# Patient Record
Sex: FEMALE | Race: WHITE | NOT HISPANIC OR LATINO | URBAN - METROPOLITAN AREA
[De-identification: names, ages, dates, MRNs, and addresses within clinical notes are randomized per-mention and may not be internally consistent; named-entity substitution may affect disease eponyms.]

---

## 2019-02-13 ENCOUNTER — APPOINTMENT (RX ONLY)
Dept: URBAN - METROPOLITAN AREA CLINIC 129 | Facility: CLINIC | Age: 59
Setting detail: DERMATOLOGY
End: 2019-02-13

## 2019-02-13 DIAGNOSIS — Z41.9 ENCOUNTER FOR PROCEDURE FOR PURPOSES OTHER THAN REMEDYING HEALTH STATE, UNSPECIFIED: ICD-10-CM

## 2019-02-13 PROCEDURE — ? BOTOX

## 2019-02-13 NOTE — PROCEDURE: BOTOX
Consent: Written consent obtained. Risks include but not limited to lid/brow ptosis, bruising, swelling, diplopia, temporary effect, incomplete chemical denervation.
Additional Area 2 Units: 0
Detail Level: Detailed
Periorbital Skin Units: [address] [address]
Post-Care Instructions: Patient instructed to not lie down for 4 hours and limit physical activity for 24 hours. Patient instructed not to travel by airplane for 48 hours.
Forehead Units: 8
Glabellar Complex Units: 15
Dilution (U/0.1 Cc): 1

## 2020-02-11 ENCOUNTER — APPOINTMENT (RX ONLY)
Dept: URBAN - METROPOLITAN AREA CLINIC 127 | Facility: CLINIC | Age: 60
Setting detail: DERMATOLOGY
End: 2020-02-11

## 2020-02-11 DIAGNOSIS — Z41.9 ENCOUNTER FOR PROCEDURE FOR PURPOSES OTHER THAN REMEDYING HEALTH STATE, UNSPECIFIED: ICD-10-CM

## 2020-02-11 PROCEDURE — ? BOTOX

## 2020-02-11 NOTE — PROCEDURE: BOTOX
Additional Area 3 Units: 0
Consent: Written consent obtained. Risks include but not limited to lid/brow ptosis, bruising, swelling, diplopia, temporary effect, incomplete chemical denervation.
Show Additional Area 5: Yes
Show Ucl Units: No
Forehead Units: 8
Detail Level: Detailed
Post-Care Instructions: Patient instructed to not lie down for 4 hours and limit physical activity for 24 hours. Patient instructed not to travel by airplane for 48 hours.
Dilution (U/0.1 Cc): 1
Glabellar Complex Units: 15
Periorbital Skin Units: [address] [address]

## 2021-02-18 ENCOUNTER — APPOINTMENT (RX ONLY)
Dept: URBAN - METROPOLITAN AREA CLINIC 126 | Facility: CLINIC | Age: 61
Setting detail: DERMATOLOGY
End: 2021-02-18

## 2021-02-18 DIAGNOSIS — Z41.9 ENCOUNTER FOR PROCEDURE FOR PURPOSES OTHER THAN REMEDYING HEALTH STATE, UNSPECIFIED: ICD-10-CM

## 2021-02-18 PROCEDURE — ? FILLERS

## 2021-02-18 PROCEDURE — ? BOTOX

## 2021-02-18 NOTE — PROCEDURE: BOTOX
Anterior Platysmal Bands Units: 0
Show Glabellar Units: Yes
Expiration Date (Month Year): 3/2023
Additional Area 3 Location: bunnies
Show Mentalis Units: No
Periorbital Skin Units: [address] [address]
Lot #: [id number]
Additional Area 4 Location: upper lip lines
Detail Level: Simple
Glabellar Complex Units: 15
Post-Care Instructions: Patient instructed to not lie down for 4 hours and limit physical activity for 24 hours. Patient instructed not to travel by airplane for 48 hours.
Additional Area 1 Location: brow lift
Additional Area 5 Location: left lateral forehead
Dilution (U/0.1 Cc): 1
Forehead Units: 8
Additional Area 2 Location: parentheses
Consent: Written consent obtained. Risks include but not limited to lid/brow ptosis, bruising, swelling, diplopia, temporary effect, incomplete chemical denervation.
Additional Area 6 Location: axillae

## 2021-02-18 NOTE — PROCEDURE: FILLERS
[place]ion[place] Lines Filler  Volume In Cc: 0
Additional Area 3 Location: corners
Detail Level: Zone
Additional Area 2 Location: lip borders
Include Cannula Information In Note?: No
Additional Area 5 Location: earlobes
Consent: Written consent obtained. Risks include but not limited to bruising, beading, irregular texture, ulceration, infection, allergic reaction, scar formation, incomplete augmentation, temporary nature, procedural pain.
Marionette Lines Filler  Volume In Cc: 1
Additional Area 4 Location: smile lines
Lot #: [id number]
Post-Care Instructions: Patient instructed to apply ice to reduce swelling.
Use Map Statement For Sites (Optional): Yes
Additional Area 5 Location: chin
Expiration Date (Month Year): 6/21/2021
Lot #: [id number]
Lot #: [id number]
Expiration Date (Month Year): 6/07/2020
Lot #: [id number]
Map Statment: See [address] for Complete Details
Additional Area 1 Location: glabella
Expiration Date (Month Year): 7/31/2021
Additional Area 1 Location: lip lines
Additional Area 1 Location: lip volume
Filler: Juvederm Ultra Plus
Additional Area 2 Location: upper lip lines
Additional Area 3 Location: corners of mouth
Additional Area 4 Location: upper and lower lip lines

## 2022-02-14 ENCOUNTER — APPOINTMENT (RX ONLY)
Dept: URBAN - METROPOLITAN AREA CLINIC 126 | Facility: CLINIC | Age: 62
Setting detail: DERMATOLOGY
End: 2022-02-14

## 2022-02-14 DIAGNOSIS — Z41.9 ENCOUNTER FOR PROCEDURE FOR PURPOSES OTHER THAN REMEDYING HEALTH STATE, UNSPECIFIED: ICD-10-CM

## 2022-02-14 PROCEDURE — ? BOTOX

## 2022-02-14 NOTE — PROCEDURE: BOTOX
[place] Units: 0
Show Periorbital Units: Yes
Show Right And Left Periorbital Units: No
Detail Level: Simple
Additional Area 6 Location: [facility]
Additional Area 5 Location: upper lip lines
Additional Area 3 Location: bunnies
Dilution (U/0.1 Cc): 1
Periorbital Skin Units: [address] [address]
Post-Care Instructions: Patient instructed to not lie down for 4 hours and limit physical activity for 24 hours. Patient instructed not to travel by airplane for 48 hours.
Additional Area 2 Location: parentheses
Forehead Units: 8
Expiration Date (Month Year): 8/2023
Consent: Written consent obtained. Risks include but not limited to lid/brow ptosis, bruising, swelling, diplopia, temporary effect, incomplete chemical denervation.
Lot #: [id number]
Additional Area 4 Location: upper forehead
Additional Area 1 Location: brow lift
Glabellar Complex Units: 15

## 2022-03-22 ENCOUNTER — APPOINTMENT (RX ONLY)
Dept: URBAN - METROPOLITAN AREA CLINIC 126 | Facility: CLINIC | Age: 62
Setting detail: DERMATOLOGY
End: 2022-03-22

## 2022-03-22 DIAGNOSIS — Z41.9 ENCOUNTER FOR PROCEDURE FOR PURPOSES OTHER THAN REMEDYING HEALTH STATE, UNSPECIFIED: ICD-10-CM

## 2022-03-22 PROCEDURE — ? JUVEDERM ULTRA PLUS XC INJECTION

## 2022-03-22 ASSESSMENT — LOCATION DETAILED DESCRIPTION DERM
LOCATION DETAILED: LEFT MEDIAL BUCCAL CHEEK
LOCATION DETAILED: RIGHT MEDIAL BUCCAL CHEEK

## 2022-03-22 ASSESSMENT — LOCATION ZONE DERM: LOCATION ZONE: FACE

## 2022-03-22 ASSESSMENT — LOCATION SIMPLE DESCRIPTION DERM
LOCATION SIMPLE: LEFT CHEEK
LOCATION SIMPLE: RIGHT CHEEK

## 2022-03-22 NOTE — PROCEDURE: JUVEDERM ULTRA PLUS XC INJECTION
[place] Filler  Volume In Cc: 0
Lot #: [id number]
Use Map Statement For Sites (Optional): No
Number Of Syringes (Required For Inventory): 1
Additional Area 1 Volume In Cc: 0.2
Topical Anesthesia?: BLT cream (benzocaine 20%, lidocaine 6%, tetracaine 4%)
Post-Care Instructions: Patient instructed to apply ice to reduce swelling.
Marionette Lines Filler  Volume In Cc: 0.4
Anesthesia Volume In Cc: 0.5
Show Inventory Tab: Hide
Map Statment: See [address] for Complete Details
Expiration Date (Month Year): 10/28/2022
Filler: Juvederm Ultra Plus XC
Detail Level: Detailed
Additional Area 1 Location: oral commisures
Procedural Text: The filler was administered to the treatment areas noted above.
Consent: Written consent obtained. Risks include but not limited to bruising, beading, irregular texture, ulceration, infection, allergic reaction, scar formation, incomplete augmentation, temporary nature, procedural pain.
Additional Anesthesia Volume In Cc: 6

## 2022-06-06 ENCOUNTER — OFFICE VISIT (OUTPATIENT)
Dept: FAMILY MEDICINE CLINIC | Facility: CLINIC | Age: 62
End: 2022-06-06

## 2022-06-06 VITALS
OXYGEN SATURATION: 98 % | DIASTOLIC BLOOD PRESSURE: 100 MMHG | HEART RATE: 63 BPM | TEMPERATURE: 97.5 F | SYSTOLIC BLOOD PRESSURE: 160 MMHG | BODY MASS INDEX: 27.55 KG/M2 | WEIGHT: 161.4 LBS | HEIGHT: 64 IN

## 2022-06-06 DIAGNOSIS — R00.0 TACHYCARDIA: ICD-10-CM

## 2022-06-06 DIAGNOSIS — R41.840 ATTENTION OR CONCENTRATION DEFICIT: ICD-10-CM

## 2022-06-06 DIAGNOSIS — Z13.220 LIPID SCREENING: ICD-10-CM

## 2022-06-06 DIAGNOSIS — G47.09 OTHER INSOMNIA: ICD-10-CM

## 2022-06-06 DIAGNOSIS — Z83.3 FAMILY HISTORY OF DIABETES MELLITUS: Primary | ICD-10-CM

## 2022-06-06 DIAGNOSIS — Z00.00 PREVENTATIVE HEALTH CARE: ICD-10-CM

## 2022-06-06 DIAGNOSIS — I10 PRIMARY HYPERTENSION: ICD-10-CM

## 2022-06-06 PROCEDURE — 99204 OFFICE O/P NEW MOD 45 MIN: CPT | Performed by: NURSE PRACTITIONER

## 2022-06-06 RX ORDER — BUPROPION HYDROCHLORIDE 150 MG/1
150 TABLET ORAL DAILY
Qty: 90 TABLET | Refills: 1 | Status: SHIPPED | OUTPATIENT
Start: 2022-06-06 | End: 2022-06-22

## 2022-06-06 RX ORDER — LISINOPRIL 20 MG/1
20 TABLET ORAL DAILY
Qty: 30 TABLET | Refills: 2 | Status: SHIPPED | OUTPATIENT
Start: 2022-06-06 | End: 2022-09-26

## 2022-06-06 NOTE — PATIENT INSTRUCTIONS
Fasting blood work  Lisinopril 20 mg daily  Monitor blood pressure with parameters given  Wellbutrin  mg daily  Follow-up 6 months

## 2022-06-06 NOTE — PROGRESS NOTES
"    Milana Hicks is a 62 y.o. female.     62-year-old white female with history of hypertension, migraines, ADHD who comes in today to be established as new patient    Blood pressure 160/100 heart rate 62 with small systolic murmur she denies any chest pain, dyspnea or dizziness  She states she used to be on lisinopril which controlled her pressure well I placed her on 20 lisinopril and gave her parameters to follow to monitor blood pressure with    Patient was diagnosed with ADHD but does not like the medication she states she felt better when she was taking Wellbutrin in the past and also slept better so I am placing her on her dose of Wellbutrin.  She is having trouble sleeping and hopefully this will help she has tried over-the-counter and she thinks she is try trazodone for sleep    She does have some mild tachycardia intermittently at night and I will be checking thyroid levels today    Weight is 161 with a BMI 27.7 she has not been vaccinated for COVID she is up-to-date on her eye exam had a mammogram and colonoscopy at [facility] I will try to get records.  She is up-to-date on DEXA scan  and just had a mammogram in May of this year              Fasting blood work  Lisinopril 20 mg daily  Monitor blood pressure with parameters given  Wellbutrin  mg daily  Follow-up 6 months           The following portions of the patient's history were reviewed and updated as appropriate: allergies, current medications, past family history, past medical history, past social history, past surgical history and problem list.    Vitals:    06/06/22 1110 06/06/22 1114   BP: 177/96 160/100   BP Location: Right arm Right arm   Patient Position: Sitting Sitting   Cuff Size: Adult Adult   Pulse: 63    Temp: 97.5 °F (36.4 °C)    TempSrc: Infrared    SpO2: 98%    Weight: 73.2 kg (161 lb 6.4 oz)    Height: 162.6 cm (64\")      Body mass index is 27.7 kg/m².    Past Medical History:   Diagnosis Date   • ADHD (attention " deficit hyperactivity disorder)    • Vitamin D deficiency      Past Surgical History:   Procedure Laterality Date   • NO PAST SURGERIES       Family History   Problem Relation Age of Onset   • Lung cancer Mother    • Diabetes Mother    • Heart attack Father      Immunization History   Administered Date(s) Administered   • Flu Vaccine Intradermal Quad 18-64YR 09/01/2020   • Flu Vaccine Quad PF 6-35MO 12/12/2018   • Flublok 18+yrs 09/04/2020   • Hep A / Hep B 05/04/2018, 06/18/2018   • Influenza Quad Vaccine (Inpatient) 10/27/2014   • Influenza TIV (IM) 10/01/2015   • Shingrix 09/01/2020, 09/04/2020, 12/10/2020   • Tdap 05/01/2012       Office Visit on 09/27/2016   Component Date Value Ref Range Status   • 25 Hydroxy, Vitamin D 09/27/2016 27.5 (A) 30.0 - 100.0 ng/mL Final    Comment: Reference Range for Total Vitamin D 25(OH)  Deficiency    <20.0 ng/mL  Insufficiency 21-29 ng/mL  Sufficiency    ng/mL  Toxicity      >100 ng/ml              Review of Systems   Constitutional: Negative.    HENT: Negative.    Respiratory: Negative.    Cardiovascular: Negative.    Gastrointestinal: Negative.    Genitourinary: Negative.    Musculoskeletal: Negative.    Skin: Negative.    Neurological: Negative.    Psychiatric/Behavioral: Positive for sleep disturbance. The patient is nervous/anxious.        Objective   Physical Exam  Constitutional:       Appearance: Normal appearance.   HENT:      Head: Normocephalic.   Cardiovascular:      Rate and Rhythm: Normal rate and regular rhythm.      Pulses: Normal pulses.      Heart sounds: Murmur heard.   Pulmonary:      Effort: Pulmonary effort is normal.      Breath sounds: Normal breath sounds.   Abdominal:      General: Bowel sounds are normal.   Musculoskeletal:         General: Normal range of motion.   Skin:     General: Skin is warm and dry.   Neurological:      General: No focal deficit present.      Mental Status: She is alert and oriented to person, place, and time.    Psychiatric:         Mood and Affect: Mood normal.         Behavior: Behavior normal.         Procedures    Assessment & Plan   Diagnoses and all orders for this visit:    1. Family history of diabetes mellitus (Primary)  -     Hemoglobin A1c; Future    2. Preventative health care  -     CBC & Differential; Future  -     Comprehensive Metabolic Panel; Future    3. Tachycardia  -     T3; Future  -     TSH+Free T4; Future    4. Lipid screening  -     Lipid Panel With LDL / HDL Ratio; Future    5. Other insomnia    6. Attention or concentration deficit    7. Primary hypertension    Other orders  -     lisinopril (PRINIVIL,ZESTRIL) 20 MG tablet; Take 1 tablet by mouth Daily.  Dispense: 30 tablet; Refill: 2  -     buPROPion XL (Wellbutrin XL) 150 MG 24 hr tablet; Take 1 tablet by mouth Daily.  Dispense: 90 tablet; Refill: 1          Current Outpatient Medications:   •  buPROPion XL (Wellbutrin XL) 150 MG 24 hr tablet, Take 1 tablet by mouth Daily., Disp: 90 tablet, Rfl: 1  •  lisinopril (PRINIVIL,ZESTRIL) 20 MG tablet, Take 1 tablet by mouth Daily., Disp: 30 tablet, Rfl: 2           NOAH West 6/6/2022 12:44 EDT  This note has been electronically signed

## 2022-06-08 LAB
ALBUMIN SERPL-MCNC: 4.4 G/DL (ref 3.8–4.8)
ALBUMIN/GLOB SERPL: 1.9 {RATIO} (ref 1.2–2.2)
ALP SERPL-CCNC: 79 IU/L (ref 44–121)
ALT SERPL-CCNC: 17 IU/L (ref 0–32)
AST SERPL-CCNC: 15 IU/L (ref 0–40)
BASOPHILS # BLD AUTO: 0 X10E3/UL (ref 0–0.2)
BASOPHILS NFR BLD AUTO: 1 %
BILIRUB SERPL-MCNC: 0.4 MG/DL (ref 0–1.2)
BUN SERPL-MCNC: 20 MG/DL (ref 8–27)
BUN/CREAT SERPL: 27 (ref 12–28)
CALCIUM SERPL-MCNC: 9.3 MG/DL (ref 8.7–10.3)
CHLORIDE SERPL-SCNC: 105 MMOL/L (ref 96–106)
CHOLEST SERPL-MCNC: 187 MG/DL (ref 100–199)
CO2 SERPL-SCNC: 24 MMOL/L (ref 20–29)
CREAT SERPL-MCNC: 0.73 MG/DL (ref 0.57–1)
EGFRCR SERPLBLD CKD-EPI 2021: 92 ML/MIN/1.73
EOSINOPHIL # BLD AUTO: 0.1 X10E3/UL (ref 0–0.4)
EOSINOPHIL NFR BLD AUTO: 3 %
ERYTHROCYTE [DISTWIDTH] IN BLOOD BY AUTOMATED COUNT: 11.8 % (ref 11.7–15.4)
GLOBULIN SER CALC-MCNC: 2.3 G/DL (ref 1.5–4.5)
GLUCOSE SERPL-MCNC: 91 MG/DL (ref 65–99)
HBA1C MFR BLD: 5.2 % (ref 4.8–5.6)
HCT VFR BLD AUTO: 39.6 % (ref 34–46.6)
HDLC SERPL-MCNC: 72 MG/DL
HGB BLD-MCNC: 12.8 G/DL (ref 11.1–15.9)
IMM GRANULOCYTES # BLD AUTO: 0 X10E3/UL (ref 0–0.1)
IMM GRANULOCYTES NFR BLD AUTO: 0 %
LDLC SERPL CALC-MCNC: 104 MG/DL (ref 0–99)
LDLC/HDLC SERPL: 1.4 RATIO (ref 0–3.2)
LYMPHOCYTES # BLD AUTO: 1.2 X10E3/UL (ref 0.7–3.1)
LYMPHOCYTES NFR BLD AUTO: 34 %
MCH RBC QN AUTO: 30.4 PG (ref 26.6–33)
MCHC RBC AUTO-ENTMCNC: 32.3 G/DL (ref 31.5–35.7)
MCV RBC AUTO: 94 FL (ref 79–97)
MONOCYTES # BLD AUTO: 0.4 X10E3/UL (ref 0.1–0.9)
MONOCYTES NFR BLD AUTO: 10 %
NEUTROPHILS # BLD AUTO: 1.9 X10E3/UL (ref 1.4–7)
NEUTROPHILS NFR BLD AUTO: 52 %
PLATELET # BLD AUTO: 204 X10E3/UL (ref 150–450)
POTASSIUM SERPL-SCNC: 4.6 MMOL/L (ref 3.5–5.2)
PROT SERPL-MCNC: 6.7 G/DL (ref 6–8.5)
RBC # BLD AUTO: 4.21 X10E6/UL (ref 3.77–5.28)
SODIUM SERPL-SCNC: 143 MMOL/L (ref 134–144)
T3 SERPL-MCNC: 104 NG/DL (ref 71–180)
T4 FREE SERPL-MCNC: 1.06 NG/DL (ref 0.82–1.77)
TRIGL SERPL-MCNC: 55 MG/DL (ref 0–149)
TSH SERPL DL<=0.005 MIU/L-ACNC: 1.65 UIU/ML (ref 0.45–4.5)
VLDLC SERPL CALC-MCNC: 11 MG/DL (ref 5–40)
WBC # BLD AUTO: 3.6 X10E3/UL (ref 3.4–10.8)

## 2022-06-21 ENCOUNTER — TELEPHONE (OUTPATIENT)
Dept: FAMILY MEDICINE CLINIC | Facility: CLINIC | Age: 62
End: 2022-06-21

## 2022-06-21 NOTE — TELEPHONE ENCOUNTER
Pt calling in concerned about her blood pressure. Pt states it was in the 160s/90s, I scheduled her an appt with Janey for tomorrow morning. Pt is asking if she should change her blood pressure medication for today and see if that helps, please call pt to advise.

## 2022-06-22 ENCOUNTER — OFFICE VISIT (OUTPATIENT)
Dept: FAMILY MEDICINE CLINIC | Facility: CLINIC | Age: 62
End: 2022-06-22

## 2022-06-22 VITALS
HEART RATE: 60 BPM | HEIGHT: 64 IN | BODY MASS INDEX: 27.52 KG/M2 | OXYGEN SATURATION: 99 % | TEMPERATURE: 97.1 F | DIASTOLIC BLOOD PRESSURE: 82 MMHG | SYSTOLIC BLOOD PRESSURE: 150 MMHG | WEIGHT: 161.2 LBS

## 2022-06-22 DIAGNOSIS — I10 PRIMARY HYPERTENSION: Primary | ICD-10-CM

## 2022-06-22 DIAGNOSIS — G47.09 OTHER INSOMNIA: ICD-10-CM

## 2022-06-22 DIAGNOSIS — F41.9 ANXIETY: ICD-10-CM

## 2022-06-22 PROCEDURE — 99214 OFFICE O/P EST MOD 30 MIN: CPT | Performed by: NURSE PRACTITIONER

## 2022-06-22 RX ORDER — HYDROXYZINE 50 MG/1
50 TABLET, FILM COATED ORAL 3 TIMES DAILY PRN
Qty: 14 TABLET | Refills: 2 | Status: SHIPPED | OUTPATIENT
Start: 2022-06-22 | End: 2022-06-22 | Stop reason: SDUPTHER

## 2022-06-22 RX ORDER — HYDROXYZINE 50 MG/1
TABLET, FILM COATED ORAL
Qty: 14 TABLET | Refills: 2 | Status: SHIPPED | OUTPATIENT
Start: 2022-06-22 | End: 2023-01-03

## 2022-06-22 NOTE — PROGRESS NOTES
Milana Hicks is a 62 y.o. female.     62-year-old white female with history of hypertension, migraines, ADHD who comes in today with concerns about blood pressure    Blood pressure today 150/82.  Yesterday she states it was running high and I told her to take a half of a 20 mg extra of her lisinopril and she states it came down into the 120s over 70s.  Patient unfortunately has not been checking her blood pressure up to this point even though we placed her on new medication 1 June.  She is going to get a blood pressure cuff and monitor blood pressure and let me know if it does not stay within the parameters I gave her.  She denies any chest pain, dyspnea, tachycardia or dizziness and does not do a lot of caffeine    On last visit patient requested to be placed on Wellbutrin since she kind of had a history of ADHD and was not sleeping well.  I also recommended she take Benadryl at night.  Benadryl fortunately is helping her sleep better but she does not notice a lot of difference with the Wellbutrin.  She thinks her problem is more anxiety than anything else.  We will go to wean off the anxiety and we will start with some hydroxyzine to see if this controls her periods of anxiety if not we discussed other options as well.  Patient is going to follow-up and let me know how this is working for her.  Weight is the same at 161 with a BMI 27.7              Monitor blood pressure with parameters given if they do not remain in parameters call office we will increase lisinopril  Hydroxyzine 50 mg 1/2 to 1 tablet 3-4 times a day as needed for anxiety monitor for drowsiness  Continue taking Benadryl for sleep  Follow-up in 1 month to see if we need to adjust medications for anxiety       The following portions of the patient's history were reviewed and updated as appropriate: allergies, current medications, past family history, past medical history, past social history, past surgical history and problem  "list.    Vitals:    06/22/22 0945   BP: 150/82   BP Location: Left arm   Patient Position: Sitting   Cuff Size: Adult   Pulse: 60   Temp: 97.1 °F (36.2 °C)   TempSrc: Temporal   SpO2: 99%   Weight: 73.1 kg (161 lb 3.2 oz)   Height: 162.6 cm (64\")     Body mass index is 27.67 kg/m².    Past Medical History:   Diagnosis Date   • ADHD (attention deficit hyperactivity disorder)    • Vitamin D deficiency      Past Surgical History:   Procedure Laterality Date   • NO PAST SURGERIES       Family History   Problem Relation Age of Onset   • Lung cancer Mother    • Diabetes Mother    • Heart attack Father      Immunization History   Administered Date(s) Administered   • Flu Vaccine Intradermal Quad 18-64YR 09/01/2020   • Flu Vaccine Quad PF 6-35MO 12/12/2018   • Flublok 18+yrs 09/04/2020   • Hep A / Hep B 05/04/2018, 06/18/2018   • Influenza Quad Vaccine (Inpatient) 10/27/2014   • Influenza TIV (IM) 10/01/2015   • Shingrix 09/01/2020, 09/04/2020, 12/10/2020   • Tdap 05/01/2012       Office Visit on 06/06/2022   Component Date Value Ref Range Status   • Hemoglobin A1C 06/07/2022 5.2  4.8 - 5.6 % Final    Comment:          Prediabetes: 5.7 - 6.4           Diabetes: >6.4           Glycemic control for adults with diabetes: <7.0     • TSH 06/07/2022 1.650  0.450 - 4.500 uIU/mL Final   • Free T4 06/07/2022 1.06  0.82 - 1.77 ng/dL Final   • T3, Total 06/07/2022 104  71 - 180 ng/dL Final   • Total Cholesterol 06/07/2022 187  100 - 199 mg/dL Final   • Triglycerides 06/07/2022 55  0 - 149 mg/dL Final   • HDL Cholesterol 06/07/2022 72  >39 mg/dL Final   • VLDL Cholesterol Mohit 06/07/2022 11  5 - 40 mg/dL Final   • LDL Chol Calc ([facility]) 06/07/2022 104 (A) 0 - 99 mg/dL Final   • LDL/HDL RATIO 06/07/2022 1.4  0.0 - 3.2 ratio Final    Comment:                                     LDL/HDL Ratio                                              Men  Women                                1/2 Avg.Risk  1.0    1.5                                    " Avg.Risk  3.6    3.2                                 2X Avg.Risk  6.2    5.0                                 3X Avg.Risk  8.0    6.1     • Glucose 06/07/2022 91  65 - 99 mg/dL Final   • BUN 06/07/2022 20  8 - 27 mg/dL Final   • Creatinine 06/07/2022 0.73  0.57 - 1.00 mg/dL Final   • EGFR Result 06/07/2022 92  >59 mL/min/1.73 Final   • BUN/Creatinine Ratio 06/07/2022 27  12 - 28 Final   • Sodium 06/07/2022 143  134 - 144 mmol/L Final   • Potassium 06/07/2022 4.6  3.5 - 5.2 mmol/L Final   • Chloride 06/07/2022 105  96 - 106 mmol/L Final   • Total CO2 06/07/2022 24  20 - 29 mmol/L Final   • Calcium 06/07/2022 9.3  8.7 - 10.3 mg/dL Final   • Total Protein 06/07/2022 6.7  6.0 - 8.5 g/dL Final   • Albumin 06/07/2022 4.4  3.8 - 4.8 g/dL Final   • Globulin 06/07/2022 2.3  1.5 - 4.5 g/dL Final   • A/G Ratio 06/07/2022 1.9  1.2 - 2.2 Final   • Total Bilirubin 06/07/2022 0.4  0.0 - 1.2 mg/dL Final   • Alkaline Phosphatase 06/07/2022 79  44 - 121 IU/L Final   • AST (SGOT) 06/07/2022 15  0 - 40 IU/L Final   • ALT (SGPT) 06/07/2022 17  0 - 32 IU/L Final   • WBC 06/07/2022 3.6  3.4 - 10.8 x10E3/uL Final   • RBC 06/07/2022 4.21  3.77 - 5.28 x10E6/uL Final   • Hemoglobin 06/07/2022 12.8  11.1 - 15.9 g/dL Final   • Hematocrit 06/07/2022 39.6  34.0 - 46.6 % Final   • MCV 06/07/2022 94  79 - 97 fL Final   • MCH 06/07/2022 30.4  26.6 - 33.0 pg Final   • MCHC 06/07/2022 32.3  31.5 - 35.7 g/dL Final   • RDW 06/07/2022 11.8  11.7 - 15.4 % Final   • Platelets 06/07/2022 204  150 - 450 x10E3/uL Final   • Neutrophil Rel % 06/07/2022 52  Not Estab. % Final   • Lymphocyte Rel % 06/07/2022 34  Not Estab. % Final   • Monocyte Rel % 06/07/2022 10  Not Estab. % Final   • Eosinophil Rel % 06/07/2022 3  Not Estab. % Final   • Basophil Rel % 06/07/2022 1  Not Estab. % Final   • Neutrophils Absolute 06/07/2022 1.9  1.4 - 7.0 x10E3/uL Final   • Lymphocytes Absolute 06/07/2022 1.2  0.7 - 3.1 x10E3/uL Final   • Monocytes Absolute 06/07/2022 0.4  0.1 - 0.9  x10E3/uL Final   • Eosinophils Absolute 06/07/2022 0.1  0.0 - 0.4 x10E3/uL Final   • Basophils Absolute 06/07/2022 0.0  0.0 - 0.2 x10E3/uL Final   • Immature Granulocyte Rel % 06/07/2022 0  Not Estab. % Final   • Immature Grans Absolute 06/07/2022 0.0  0.0 - 0.1 x10E3/uL Final         Review of Systems   HENT: Negative.    Respiratory: Negative.    Cardiovascular: Negative.    Gastrointestinal: Negative.    Genitourinary: Negative.    Musculoskeletal: Negative.    Skin: Negative.    Neurological: Negative.    Psychiatric/Behavioral: The patient is nervous/anxious.        Objective   Physical Exam  Constitutional:       Appearance: Normal appearance.   HENT:      Head: Normocephalic.   Cardiovascular:      Rate and Rhythm: Normal rate and regular rhythm.      Pulses: Normal pulses.      Heart sounds: Normal heart sounds.   Pulmonary:      Effort: Pulmonary effort is normal.      Breath sounds: Normal breath sounds.   Abdominal:      General: Bowel sounds are normal.   Musculoskeletal:         General: Normal range of motion.   Skin:     General: Skin is warm and dry.   Neurological:      General: No focal deficit present.      Mental Status: She is alert and oriented to person, place, and time.   Psychiatric:         Mood and Affect: Mood normal.         Behavior: Behavior normal.         Procedures    Assessment & Plan   Diagnoses and all orders for this visit:    1. Primary hypertension (Primary)    2. Other insomnia    3. Anxiety    Other orders  -     Discontinue: hydrOXYzine (ATARAX) 50 MG tablet; Take 1 tablet by mouth 3 (Three) Times a Day As Needed for Itching.  Dispense: 14 tablet; Refill: 2  -     hydrOXYzine (ATARAX) 50 MG tablet; 1/2 to 1 tab 3-4 x a day as needed for anxiety  Dispense: 14 tablet; Refill: 2          Current Outpatient Medications:   •  hydrOXYzine (ATARAX) 50 MG tablet, 1/2 to 1 tab 3-4 x a day as needed for anxiety, Disp: 14 tablet, Rfl: 2  •  lisinopril (PRINIVIL,ZESTRIL) 20 MG tablet,  Take 1 tablet by mouth Daily., Disp: 30 tablet, Rfl: 2           Janey De La Torre, APRN 6/22/2022 11:48 EDT  This note has been electronically signed

## 2022-06-22 NOTE — PATIENT INSTRUCTIONS
Monitor blood pressure with parameters given if they do not remain in parameters call office we will increase lisinopril  Hydroxyzine 50 mg 1/2 to 1 tablet 3-4 times a day as needed for anxiety monitor for drowsiness  Continue taking Benadryl for sleep  Follow-up in 1 month to see if we need to adjust medications for anxiet

## 2022-09-26 RX ORDER — LISINOPRIL 20 MG/1
TABLET ORAL
Qty: 30 TABLET | Refills: 0 | Status: SHIPPED | OUTPATIENT
Start: 2022-09-26 | End: 2022-10-31

## 2022-10-31 RX ORDER — LISINOPRIL 20 MG/1
TABLET ORAL
Qty: 30 TABLET | Refills: 0 | Status: SHIPPED | OUTPATIENT
Start: 2022-10-31 | End: 2023-01-03 | Stop reason: SDUPTHER

## 2022-12-22 RX ORDER — LISINOPRIL 20 MG/1
20 TABLET ORAL DAILY
Qty: 30 TABLET | Refills: 0 | OUTPATIENT
Start: 2022-12-22

## 2022-12-27 RX ORDER — LISINOPRIL 20 MG/1
TABLET ORAL
Qty: 30 TABLET | Refills: 0 | OUTPATIENT
Start: 2022-12-27

## 2023-01-03 ENCOUNTER — OFFICE VISIT (OUTPATIENT)
Dept: FAMILY MEDICINE CLINIC | Facility: CLINIC | Age: 63
End: 2023-01-03
Payer: COMMERCIAL

## 2023-01-03 VITALS
HEART RATE: 62 BPM | BODY MASS INDEX: 26.98 KG/M2 | HEIGHT: 64 IN | WEIGHT: 158 LBS | TEMPERATURE: 97.3 F | SYSTOLIC BLOOD PRESSURE: 151 MMHG | DIASTOLIC BLOOD PRESSURE: 79 MMHG | OXYGEN SATURATION: 95 %

## 2023-01-03 DIAGNOSIS — E66.3 OVERWEIGHT WITH BODY MASS INDEX (BMI) OF 27 TO 27.9 IN ADULT: ICD-10-CM

## 2023-01-03 DIAGNOSIS — Z13.220 LIPID SCREENING: Primary | ICD-10-CM

## 2023-01-03 PROCEDURE — 99213 OFFICE O/P EST LOW 20 MIN: CPT | Performed by: NURSE PRACTITIONER

## 2023-01-03 RX ORDER — HYDROXYZINE 50 MG/1
TABLET, FILM COATED ORAL
Qty: 60 TABLET | Refills: 2 | Status: SHIPPED | OUTPATIENT
Start: 2023-01-03

## 2023-01-03 RX ORDER — LISINOPRIL 20 MG/1
20 TABLET ORAL DAILY
Qty: 90 TABLET | Refills: 1 | Status: SHIPPED | OUTPATIENT
Start: 2023-01-03

## 2023-01-03 NOTE — PATIENT INSTRUCTIONS
Fasting blood work  Have previous provider send copies of your mammogram and colonoscopy  Follow-up when you get back from [place] fasting

## 2023-01-03 NOTE — PROGRESS NOTES
Milana Hicks is a 62 y.o. female.     History of Present Illness  62-year-old white female with history of hypertension, migraine headaches, ADHD who comes in today for 6-month follow-up visit and fasting blood work    Blood pressure 150/78 heart rate 62 with small systolic murmur she denies any chest pain, dyspnea, tachycardia or dizziness    Patient's last blood work was within normal limits except for lipid panel we will be repeating that before she goes to [place] for the winter.    Patient has mammogram and colonoscopy done at [facility] however she did not update her primary care provider and the results were sent to her previous provider.  She is going to call and have them fax information to me    She has not been vaccinated for COVID or influenza is up-to-date on eye exam.  She is up-to-date on the above as well but I still need copy for the chart.  Weight is down 3 pounds at 158 with a BMI 27.1              Fasting blood work  Have previous provider send copies of your mammogram and colonoscopy  Follow-up when you get back from [place] fasting           The following portions of the patient's history were reviewed and updated as appropriate: allergies, current medications, past family history, past medical history, past social history, past surgical history and problem list.    Vitals:    01/03/23 1331   BP: 151/79   BP Location: Right arm   Patient Position: Sitting   Cuff Size: Adult   Pulse: 62   Temp: 97.3 °F (36.3 °C)   TempSrc: Temporal   SpO2: 95%   Weight: 71.7 kg (158 lb)   Height: 162.6 cm (64\")     Body mass index is 27.12 kg/m².    Past Medical History:   Diagnosis Date   • ADHD (attention deficit hyperactivity disorder)    • Arthritis    • Cancer (HCC) 1999    Melanoma   • Headache     From pre-adulthood   • History of medical problems 11/1999    Melanoma   • Hypertension 2019?   • Vitamin D deficiency      Past Surgical History:   Procedure Laterality Date   • COLONOSCOPY  2020     It was good.   • NO PAST SURGERIES     • TUBAL ABDOMINAL LIGATION  1985     Family History   Problem Relation Age of Onset   • Lung cancer Mother    • Diabetes Mother    • Heart attack Father      Immunization History   Administered Date(s) Administered   • Flu Vaccine Intradermal Quad 18-64YR 09/01/2020   • Flu Vaccine Quad PF 6-35MO 12/12/2018   • Flublok 18+yrs 09/04/2020   • Hep A / Hep B 05/04/2018, 06/18/2018   • Influenza Quad Vaccine (Inpatient) 10/27/2014   • Influenza TIV (IM) 10/01/2015   • Shingrix 09/01/2020, 09/04/2020, 12/10/2020   • Tdap 05/01/2012       Office Visit on 06/06/2022   Component Date Value Ref Range Status   • Hemoglobin A1C 06/07/2022 5.2  4.8 - 5.6 % Final    Comment:          Prediabetes: 5.7 - 6.4           Diabetes: >6.4           Glycemic control for adults with diabetes: <7.0     • TSH 06/07/2022 1.650  0.450 - 4.500 uIU/mL Final   • Free T4 06/07/2022 1.06  0.82 - 1.77 ng/dL Final   • T3, Total 06/07/2022 104  71 - 180 ng/dL Final   • Total Cholesterol 06/07/2022 187  100 - 199 mg/dL Final   • Triglycerides 06/07/2022 55  0 - 149 mg/dL Final   • HDL Cholesterol 06/07/2022 72  >39 mg/dL Final   • VLDL Cholesterol Mohit 06/07/2022 11  5 - 40 mg/dL Final   • LDL Chol Calc ([facility]) 06/07/2022 104 (H)  0 - 99 mg/dL Final   • LDL/HDL RATIO 06/07/2022 1.4  0.0 - 3.2 ratio Final    Comment:                                     LDL/HDL Ratio                                              Men  Women                                1/2 Avg.Risk  1.0    1.5                                    Avg.Risk  3.6    3.2                                 2X Avg.Risk  6.2    5.0                                 3X Avg.Risk  8.0    6.1     • Glucose 06/07/2022 91  65 - 99 mg/dL Final   • BUN 06/07/2022 20  8 - 27 mg/dL Final   • Creatinine 06/07/2022 0.73  0.57 - 1.00 mg/dL Final   • EGFR Result 06/07/2022 92  >59 mL/min/1.73 Final   • BUN/Creatinine Ratio 06/07/2022 27  12 - 28 Final   • Sodium 06/07/2022 143   134 - 144 mmol/L Final   • Potassium 06/07/2022 4.6  3.5 - 5.2 mmol/L Final   • Chloride 06/07/2022 105  96 - 106 mmol/L Final   • Total CO2 06/07/2022 24  20 - 29 mmol/L Final   • Calcium 06/07/2022 9.3  8.7 - 10.3 mg/dL Final   • Total Protein 06/07/2022 6.7  6.0 - 8.5 g/dL Final   • Albumin 06/07/2022 4.4  3.8 - 4.8 g/dL Final   • Globulin 06/07/2022 2.3  1.5 - 4.5 g/dL Final   • A/G Ratio 06/07/2022 1.9  1.2 - 2.2 Final   • Total Bilirubin 06/07/2022 0.4  0.0 - 1.2 mg/dL Final   • Alkaline Phosphatase 06/07/2022 79  44 - 121 IU/L Final   • AST (SGOT) 06/07/2022 15  0 - 40 IU/L Final   • ALT (SGPT) 06/07/2022 17  0 - 32 IU/L Final   • WBC 06/07/2022 3.6  3.4 - 10.8 x10E3/uL Final   • RBC 06/07/2022 4.21  3.77 - 5.28 x10E6/uL Final   • Hemoglobin 06/07/2022 12.8  11.1 - 15.9 g/dL Final   • Hematocrit 06/07/2022 39.6  34.0 - 46.6 % Final   • MCV 06/07/2022 94  79 - 97 fL Final   • MCH 06/07/2022 30.4  26.6 - 33.0 pg Final   • MCHC 06/07/2022 32.3  31.5 - 35.7 g/dL Final   • RDW 06/07/2022 11.8  11.7 - 15.4 % Final   • Platelets 06/07/2022 204  150 - 450 x10E3/uL Final   • Neutrophil Rel % 06/07/2022 52  Not Estab. % Final   • Lymphocyte Rel % 06/07/2022 34  Not Estab. % Final   • Monocyte Rel % 06/07/2022 10  Not Estab. % Final   • Eosinophil Rel % 06/07/2022 3  Not Estab. % Final   • Basophil Rel % 06/07/2022 1  Not Estab. % Final   • Neutrophils Absolute 06/07/2022 1.9  1.4 - 7.0 x10E3/uL Final   • Lymphocytes Absolute 06/07/2022 1.2  0.7 - 3.1 x10E3/uL Final   • Monocytes Absolute 06/07/2022 0.4  0.1 - 0.9 x10E3/uL Final   • Eosinophils Absolute 06/07/2022 0.1  0.0 - 0.4 x10E3/uL Final   • Basophils Absolute 06/07/2022 0.0  0.0 - 0.2 x10E3/uL Final   • Immature Granulocyte Rel % 06/07/2022 0  Not Estab. % Final   • Immature Grans Absolute 06/07/2022 0.0  0.0 - 0.1 x10E3/uL Final         Review of Systems   Constitutional: Negative.    HENT: Negative.    Respiratory: Negative.    Cardiovascular: Negative.     Gastrointestinal: Negative.    Genitourinary: Negative.    Musculoskeletal: Negative.    Skin: Negative.    Neurological: Negative.    Psychiatric/Behavioral: Negative.        Objective   Physical Exam  Constitutional:       Appearance: Normal appearance.   HENT:      Head: Normocephalic.   Cardiovascular:      Rate and Rhythm: Normal rate and regular rhythm.      Pulses: Normal pulses.      Heart sounds: Normal heart sounds.   Pulmonary:      Effort: Pulmonary effort is normal.      Breath sounds: Normal breath sounds.   Abdominal:      General: Bowel sounds are normal.   Musculoskeletal:         General: Normal range of motion.   Skin:     General: Skin is warm and dry.   Neurological:      General: No focal deficit present.      Mental Status: She is alert and oriented to person, place, and time.   Psychiatric:         Mood and Affect: Mood normal.         Behavior: Behavior normal.         Procedures    Assessment & Plan   Diagnoses and all orders for this visit:    1. Lipid screening (Primary)  -     Lipid Panel With LDL / HDL Ratio; Future    2. Overweight with body mass index (BMI) of 27 to 27.9 in adult    Other orders  -     hydrOXYzine (ATARAX) 50 MG tablet; 1/2 to 1 tab 3-4 x a day as needed for anxiety  Dispense: 60 tablet; Refill: 2  -     lisinopril (PRINIVIL,ZESTRIL) 20 MG tablet; Take 1 tablet by mouth Daily.  Dispense: 90 tablet; Refill: 1          Current Outpatient Medications:   •  hydrOXYzine (ATARAX) 50 MG tablet, 1/2 to 1 tab 3-4 x a day as needed for anxiety, Disp: 60 tablet, Rfl: 2  •  lisinopril (PRINIVIL,ZESTRIL) 20 MG tablet, Take 1 tablet by mouth Daily., Disp: 90 tablet, Rfl: 1           Janey De La Torre, APRN 1/3/2023 14:36 EST  This note has been electronically signed

## 2023-09-30 RX ORDER — LISINOPRIL 20 MG/1
TABLET ORAL
Qty: 90 TABLET | Refills: 0 | OUTPATIENT
Start: 2023-09-30

## 2023-10-11 ENCOUNTER — OFFICE VISIT (OUTPATIENT)
Dept: FAMILY MEDICINE CLINIC | Facility: CLINIC | Age: 63
End: 2023-10-11
Payer: COMMERCIAL

## 2023-10-11 VITALS
DIASTOLIC BLOOD PRESSURE: 68 MMHG | OXYGEN SATURATION: 99 % | BODY MASS INDEX: 28.13 KG/M2 | WEIGHT: 164.8 LBS | HEART RATE: 70 BPM | TEMPERATURE: 97.3 F | HEIGHT: 64 IN | SYSTOLIC BLOOD PRESSURE: 120 MMHG

## 2023-10-11 DIAGNOSIS — H72.91 PERFORATION OF RIGHT TYMPANIC MEMBRANE: ICD-10-CM

## 2023-10-11 DIAGNOSIS — Z78.0 POST-MENOPAUSAL: ICD-10-CM

## 2023-10-11 DIAGNOSIS — I10 PRIMARY HYPERTENSION: Primary | ICD-10-CM

## 2023-10-11 PROBLEM — E66.3 OVERWEIGHT WITH BODY MASS INDEX (BMI) OF 28 TO 28.9 IN ADULT: Status: ACTIVE | Noted: 2023-10-11

## 2023-10-11 PROCEDURE — 99213 OFFICE O/P EST LOW 20 MIN: CPT | Performed by: NURSE PRACTITIONER

## 2023-10-11 RX ORDER — LISINOPRIL 20 MG/1
20 TABLET ORAL DAILY
Qty: 90 TABLET | Refills: 1 | Status: SHIPPED | OUTPATIENT
Start: 2023-10-11

## 2023-10-11 RX ORDER — HYDROXYZINE 50 MG/1
TABLET, FILM COATED ORAL
Qty: 60 TABLET | Refills: 2 | Status: SHIPPED | OUTPATIENT
Start: 2023-10-11

## 2023-10-11 NOTE — PROGRESS NOTES
"    Milana Hicks is a 63 y.o. female.     History of Present Illness  63-year-old white female with history of hypertension, migraine headaches, ADHD comes in today for follow-up visit    Blood pressure 120/68 heart rate 70 with systolic murmur she denies any chest pain, dyspnea, tachycardia or dizziness    Patient states several months ago she ate plain and she always has problems with ear pressure and she felt her right ear pop.  TM does look ruptured however no redness and looks like is healing on its own.  She has had some hearing loss in that ear.  She also complains of having postnasal drip and recommended allergy medication    Patient's weight is up 7 pounds at 165 with BMI of 28.3.  We did discuss several weight loss options.  Patient has a hard time exercising    Patient's not been vaccinated for COVID she need to schedule an eye exam she states she had a mammogram done this month at [facility] but they still have not sent me the results.  I am scheduling her a DEXA scan.  Her next colonoscopy is due in 2030          Schedule eye exam  Diet and exercise as discussed  Report any worsening of right ear  Get copy of mammogram  DEXA scan at [place]  May consider COVID-vaccine  Follow-up 6months       The following portions of the patient's history were reviewed and updated as appropriate: allergies, current medications, past family history, past medical history, past social history, past surgical history, and problem list.    Vitals:    10/11/23 1154   BP: 120/68   BP Location: Right arm   Patient Position: Sitting   Cuff Size: Adult   Pulse: 70   Temp: 97.3 øF (36.3 øC)   TempSrc: Infrared   SpO2: 99%   Weight: 74.8 kg (164 lb 12.8 oz)   Height: 162.6 cm (64.02\")     Body mass index is 28.27 kg/mý.    Past Medical History:   Diagnosis Date    ADHD (attention deficit hyperactivity disorder)     Arthritis     Cancer 1999    Melanoma    Headache     From pre-adulthood    History of medical problems 11/1999    " Melanoma    Hypertension 2019?    Vitamin D deficiency      Past Surgical History:   Procedure Laterality Date    COLONOSCOPY  2020    It was good.    NO PAST SURGERIES      TUBAL ABDOMINAL LIGATION  1985     Family History   Problem Relation Age of Onset    Lung cancer Mother     Diabetes Mother     Heart attack Father      Immunization History   Administered Date(s) Administered    Flu Vaccine Intradermal Quad 18-64YR 09/01/2020    Flu Vaccine Quad PF 6-35MO 12/12/2018    Flublok 18+yrs 09/04/2020    Hep A / Hep B 05/04/2018, 06/18/2018    Influenza Quad Vaccine (Inpatient) 10/27/2014    Influenza TIV (IM) 10/01/2015    Shingrix 09/01/2020, 09/04/2020, 12/10/2020    Tdap 05/01/2012       Results Encounter on 01/08/2023   Component Date Value Ref Range Status    Total Cholesterol 08/16/2023 171  100 - 199 mg/dL Final    Triglycerides 08/16/2023 55  0 - 149 mg/dL Final    HDL Cholesterol 08/16/2023 82  >39 mg/dL Final    VLDL Cholesterol Mohit 08/16/2023 11  5 - 40 mg/dL Final    LDL Chol Calc ([facility]) 08/16/2023 78  0 - 99 mg/dL Final    LDL/HDL RATIO 08/16/2023 1.0  0.0 - 3.2 ratio Final    Comment:                                     LDL/HDL Ratio                                              Men  Women                                1/2 Avg.Risk  1.0    1.5                                    Avg.Risk  3.6    3.2                                 2X Avg.Risk  6.2    5.0                                 3X Avg.Risk  8.0    6.1           Review of Systems   Constitutional: Negative.    HENT:  Positive for hearing loss.    Respiratory:  Positive for cough.    Cardiovascular: Negative.    Gastrointestinal: Negative.    Genitourinary: Negative.    Musculoskeletal: Negative.    Skin: Negative.    Neurological: Negative.    Psychiatric/Behavioral: Negative.         Objective   Physical Exam  Constitutional:       Appearance: Normal appearance.   HENT:      Head: Normocephalic.   Cardiovascular:      Rate and Rhythm: Normal rate  and regular rhythm.      Pulses: Normal pulses.      Heart sounds: Normal heart sounds.   Pulmonary:      Effort: Pulmonary effort is normal.      Breath sounds: Normal breath sounds.   Abdominal:      General: Bowel sounds are normal.   Musculoskeletal:         General: Normal range of motion.   Skin:     General: Skin is warm.   Neurological:      General: No focal deficit present.      Mental Status: She is alert and oriented to person, place, and time.   Psychiatric:         Mood and Affect: Mood normal.         Behavior: Behavior normal.         Procedures    Assessment & Plan   Diagnoses and all orders for this visit:    1. Primary hypertension (Primary)  -     hydrOXYzine (ATARAX) 50 MG tablet; 1/2 to 1 tab 3-4 x a day as needed for anxiety  Dispense: 60 tablet; Refill: 2  -     lisinopril (PRINIVIL,ZESTRIL) 20 MG tablet; Take 1 tablet by mouth Daily.  Dispense: 90 tablet; Refill: 1    2. Post-menopausal  -     DEXA Bone Density Axial; Future    3. Perforation of right tympanic membrane           Current Outpatient Medications:     hydrOXYzine (ATARAX) 50 MG tablet, 1/2 to 1 tab 3-4 x a day as needed for anxiety, Disp: 60 tablet, Rfl: 2    lisinopril (PRINIVIL,ZESTRIL) 20 MG tablet, Take 1 tablet by mouth Daily., Disp: 90 tablet, Rfl: 1           NOAH West 10/11/2023 12:26 EDT  This note has been electronically signed

## 2023-10-11 NOTE — PATIENT INSTRUCTIONS
Schedule eye exam  Diet and exercise as discussed  Report any worsening of right ear  Get copy of mammogram  DEXA scan at [place]  May consider COVID-vaccine  Follow-up 6months

## 2023-10-24 ENCOUNTER — OFFICE VISIT (OUTPATIENT)
Dept: FAMILY MEDICINE CLINIC | Facility: CLINIC | Age: 63
End: 2023-10-24
Payer: COMMERCIAL

## 2023-10-24 VITALS
HEIGHT: 64 IN | DIASTOLIC BLOOD PRESSURE: 76 MMHG | WEIGHT: 160 LBS | OXYGEN SATURATION: 97 % | BODY MASS INDEX: 27.31 KG/M2 | SYSTOLIC BLOOD PRESSURE: 120 MMHG | TEMPERATURE: 97.2 F | HEART RATE: 73 BPM

## 2023-10-24 DIAGNOSIS — Z12.4 SCREENING FOR CERVICAL CANCER: ICD-10-CM

## 2023-10-24 DIAGNOSIS — Z00.00 ANNUAL PHYSICAL EXAM: Primary | ICD-10-CM

## 2023-10-24 NOTE — PROGRESS NOTES
"Subjective     Chief Complaint   Patient presents with    Gynecologic Exam       Milana Hicks is a 63 y.o. female who presents for an annual exam. The patient has no complaints today. The patient is sexually active. GYN screening history: last pap: was normal. The patient wears seatbelts: yes. The patient participates in regular exercise: yes. Has the patient ever been transfused or tattooed?: no. The patient reports that there is not domestic violence in her life.     Had mamogram in [place] in August and was normal.     Menstrual History:  OB History    No obstetric history on file.       No LMP recorded (lmp unknown). Patient is postmenopausal.         The following portions of the patient's history were reviewed and updated as appropriate:vital signs, allergies, current medications, past medical history, past social history, past surgical history, and problem list    Review of Systems   Pertinent items are noted in HPI.     Objective     /76 (BP Location: Right arm, Patient Position: Sitting, Cuff Size: Adult)   Pulse 73   Temp 97.2 °F (36.2 °C) (Infrared)   Ht 162.6 cm (64.02\")   Wt 72.6 kg (160 lb)   LMP  (LMP Unknown)   SpO2 97%   BMI 27.45 kg/m²       Physical Exam    General:   alert, comfortable, and cooperative   Heart: Not performed today   Lungs: Not performed today.   Breast: normal appearance, no masses or tenderness, Inspection negative, No nipple retraction or dimpling, No nipple discharge or bleeding, No axillary or supraclavicular adenopathy, Normal to palpation without dominant masses, Taught monthly breast self examination   Abdomen: {soft, non-tender. Bowel sounds normal. No masses,  no organomegaly   Vulva: normal   Vagina: normal mucosa, normal discharge   Cervix: no cervical motion tenderness, no lesions, and scant bleeding with Brooming/brushing   Uterus: non-tender   Adnexa: normal adnexa and no mass, fullness, tenderness   Rectal: Not performed today       Lab Review "   Labs: No data reviewed     Imaging   No data reviewed    Assessment & Plan     ASSESSMENT  Healthy female exam.    1. Annual physical exam    2. Screening for cervical cancer         PLAN  1. No orders of the defined types were placed in this encounter.      2. Medications prescribed this encounter:    No orders of the defined types were placed in this encounter.      3.  Discussed immunizations.  Patient declines flu vaccine as well as COVID vaccines.    The patient was counseled regarding nutrition, physical activity, healthy weight, injury prevention, misuse of tobacco, alcohol and illicit drugs, sexual behavior and STI's, contraception, dental health, mental health, immunizations, and screenings.    Follow-up with Janey De La Torre as advised.  Patient's Pap is normal this will be her last Pap due to her age of 63.    Carmelita Ivey, APRN  10/24/2023

## 2023-10-30 LAB
CYTOLOGIST CVX/VAG CYTO: NORMAL
CYTOLOGY CVX/VAG DOC CYTO: NORMAL
CYTOLOGY CVX/VAG DOC THIN PREP: NORMAL
DX ICD CODE: NORMAL
HIV 1 & 2 AB SER-IMP: NORMAL
HPV GENOTYPE REFLEX: NORMAL
HPV I/H RISK 4 DNA CVX QL PROBE+SIG AMP: NEGATIVE
Lab: NORMAL
OTHER STN SPEC: NORMAL
RECOM F/U CVX/VAG CYTO: NORMAL
STAT OF ADQ CVX/VAG CYTO-IMP: NORMAL

## 2023-11-06 ENCOUNTER — TELEPHONE (OUTPATIENT)
Dept: FAMILY MEDICINE CLINIC | Facility: CLINIC | Age: 63
End: 2023-11-06
Payer: COMMERCIAL

## 2023-11-06 NOTE — TELEPHONE ENCOUNTER
[facility] ,called and the records request got sent to them and not [facility] . Can you refax to them please and thank  you.    WDL

## 2024-04-05 DIAGNOSIS — I10 PRIMARY HYPERTENSION: ICD-10-CM

## 2024-04-06 RX ORDER — LISINOPRIL 20 MG/1
20 TABLET ORAL DAILY
Qty: 90 TABLET | Refills: 0 | OUTPATIENT
Start: 2024-04-06